# Patient Record
Sex: FEMALE | Race: WHITE | URBAN - METROPOLITAN AREA
[De-identification: names, ages, dates, MRNs, and addresses within clinical notes are randomized per-mention and may not be internally consistent; named-entity substitution may affect disease eponyms.]

---

## 2020-11-02 NOTE — PATIENT DISCUSSION
"""S/P IOL OD: [employer] ZXR00 +15.00 +Femto/Arcs +Omidria.  Continue post operative instructions and drops per schedule. "" ""Increase Pred-Moxi-Nepaf right eye three times a day

## 2020-12-14 NOTE — PATIENT DISCUSSION
"""Dry eyes OU seen on todays exam."" ""Continue Preservative free tears both eyes two - four times a day ."" ""Continue Restasis both eyes twice a day . """

## 2021-05-24 NOTE — PATIENT DISCUSSION
PCO ([id number]): Visually significant PCO present on exam today. Recommend YAG laser capsulotomy to improve vision and decrease glare symptoms. RBAs of procedure discussed. Patient agrees and wishes to proceed.

## 2021-05-24 NOTE — PATIENT DISCUSSION
"""Dry eyes OU seen on todays exam."" ""Continue Preservative free tears both eyes two - four times a day ."" ""Continue Restasis both eyes twice a day . ""."

## 2021-05-24 NOTE — PROCEDURE NOTE: CLINICAL
PROCEDURE NOTE: YAG Capsulotomy OD. Diagnosis: Posterior Capsular Opacification (PCO). Prep: Mydriacil 1% and Phenylephrine 2.5%. Prior to treatment, the risks/benefits/alternatives were discussed. The patient wished to proceed with procedure. Consent was signed. Proparacaine and brominidine were placed into the operative eye after the eye was dilated. Power = 3.0mJ. Number of pulses = 9. Patient tolerated procedure well and there were no complications. Post Laser instructions given. Monica Canter

## 2021-06-21 NOTE — PROCEDURE NOTE: CLINICAL
PROCEDURE NOTE: YAG Capsulotomy OS. Diagnosis: Posterior Capsular Opacification (PCO). Prep: Mydriacil 1% and Phenylephrine 2.5%. Prior to treatment, the risks/benefits/alternatives were discussed. The patient wished to proceed with procedure. Consent was signed. Proparacaine and brominidine were placed into the operative eye after the eye was dilated. Power = 3.0mJ. Number of pulses = 11. Patient tolerated procedure well and there were no complications. Post Laser instructions given. Ashley Ferrell

## 2023-04-10 ENCOUNTER — NEW PATIENT (OUTPATIENT)
Dept: URBAN - METROPOLITAN AREA CLINIC 53 | Facility: CLINIC | Age: 78
End: 2023-04-10

## 2023-04-10 DIAGNOSIS — R73.03: ICD-10-CM

## 2023-04-10 DIAGNOSIS — H26.493: ICD-10-CM

## 2023-04-10 DIAGNOSIS — H40.013: ICD-10-CM

## 2023-04-10 PROCEDURE — 92004 COMPRE OPH EXAM NEW PT 1/>: CPT

## 2023-04-10 ASSESSMENT — VISUAL ACUITY
OU_CC: 20/20-2
OS_CC: 20/40
OD_SC: J14
OD_CC: 20/20-2
OS_GLARE: 20/20
OU_CC: J1+
OU_SC: J14
OD_GLARE: 20/20
OS_SC: J14
OS_SC: 20/50
OU_SC: 20/40
OD_CC: J1+
OS_CC: J1+
OD_SC: 20/40

## 2023-04-10 ASSESSMENT — TONOMETRY
OS_IOP_MMHG: 12
OD_IOP_MMHG: 11

## 2023-04-24 ENCOUNTER — CONTACT LENSES/GLASSES VISIT (OUTPATIENT)
Dept: URBAN - METROPOLITAN AREA CLINIC 53 | Facility: CLINIC | Age: 78
End: 2023-04-24

## 2023-04-24 DIAGNOSIS — H52.4: ICD-10-CM

## 2023-04-24 PROCEDURE — 92015 DETERMINE REFRACTIVE STATE: CPT
